# Patient Record
(demographics unavailable — no encounter records)

---

## 2024-12-04 NOTE — PHYSICAL EXAM
[Normal Conjunctiva] : normal conjunctiva [Normal Venous Pressure] : normal venous pressure [No Carotid Bruit] : no carotid bruit [Normal S1, S2] : normal S1, S2 [Clear Lung Fields] : clear lung fields [Soft] : abdomen soft [No Edema] : no edema [No Focal Deficits] : no focal deficits [de-identified] : 1/6/ SEM

## 2024-12-04 NOTE — REVIEW OF SYSTEMS
[SOB] : shortness of breath [Palpitations] : palpitations [Dizziness] : dizziness [Negative] : Musculoskeletal

## 2024-12-04 NOTE — HISTORY OF PRESENT ILLNESS
[FreeTextEntry1] : Ms. Tanner is a 31 y.o. woman here for assessment of palpitations. She noted the onset 2 weeks ago of heart irregular pounding. There was associated dizziness and SOB. The episodes last for 10-15 seconds. There has been no loss of consciousness or chest pain. The patient had recent delivery by . Past medical issues have been anemia and GERD.

## 2025-01-02 NOTE — ASSESSMENT
[FreeTextEntry1] : PSVT discussed with patient who is breast feeding. Will defer metoprolol Rx as asymptomatic. Will call PRN RTO 2 mos

## 2025-01-02 NOTE — HISTORY OF PRESENT ILLNESS
[FreeTextEntry1] : Patient here for f/u with palpitations. Since last visit, she has had no palpitation, SOB, chest pain. Zio revealed frequent runs of short SVT.

## 2025-01-02 NOTE — PHYSICAL EXAM
[Normal Conjunctiva] : normal conjunctiva [Normal Venous Pressure] : normal venous pressure [No Carotid Bruit] : no carotid bruit [Normal S1, S2] : normal S1, S2 [Clear Lung Fields] : clear lung fields [Soft] : abdomen soft [No Edema] : no edema [No Focal Deficits] : no focal deficits [de-identified] : 1/6 CRISTIAN [de-identified] : one early systole c/w APC

## 2025-05-01 NOTE — HISTORY OF PRESENT ILLNESS
[FreeTextEntry1] : Physical Exam  [de-identified] : Patient is a 31-year-old female presents to clinic today for physical exam.  Patient is new to provider today.  Previously followed with Dr. Ortiz as PCP.   Patient previously following with Dr. Manriquez as she had palpitations.  However was recently seen earlier this month and at that time noted no palpitations in the last several months and now 8 months postpartum.  Has been overall well Planning to return to work Monday

## 2025-05-01 NOTE — ASSESSMENT
[Vaccines Reviewed] : Immunizations reviewed today. Please see immunization details in the vaccine log within the immunization flowsheet.  [FreeTextEntry1] :  -Diet and exercise discussed -f/u labs  - Pap UTD Tdap 5/2024

## 2025-05-01 NOTE — PAST MEDICAL HISTORY
[Menstruating] : menstruating [Definite ___ (Date)] : the last menstrual period was [unfilled] [Regular Cycle Intervals] : have been regular [Total Preg ___] : G[unfilled] [Live Births ___] : P[unfilled]  [AB Spont ___] : miscarriages: [unfilled]

## 2025-05-01 NOTE — HEALTH RISK ASSESSMENT
[Good] : ~his/her~  mood as  good [0] : 2) Feeling down, depressed, or hopeless: Not at all (0) [Never] : Never [NO] : No [No] : In the past 12 months have you used drugs other than those required for medical reasons? No [PHQ-2 Negative - No further assessment needed] : PHQ-2 Negative - No further assessment needed [Patient reported PAP Smear was normal] : Patient reported PAP Smear was normal [HIV test declined] : HIV test declined [Hepatitis C test declined] : Hepatitis C test declined [With Family] : lives with family [Employed] : employed [Feels Safe at Home] : Feels safe at home [Fully functional (bathing, dressing, toileting, transferring, walking, feeding)] : Fully functional (bathing, dressing, toileting, transferring, walking, feeding) [Fully functional (using the telephone, shopping, preparing meals, housekeeping, doing laundry, using] : Fully functional and needs no help or supervision to perform IADLs (using the telephone, shopping, preparing meals, housekeeping, doing laundry, using transportation, managing medications and managing finances) [de-identified] : exercising when she can given  [de-identified] : seeing dietician  [[id number]] : 0 [Reports changes in hearing] : Reports no changes in hearing [Reports changes in vision] : Reports no changes in vision [Reports normal functional visual acuity (ie: able to read med bottle)] : Reports poor functional visual acuity.  [Reports changes in dental health] : Reports no changes in dental health [PapSmearDate] : 11/23 [FreeTextEntry2] : Nurse